# Patient Record
Sex: FEMALE | Race: WHITE | Employment: PART TIME | URBAN - NONMETROPOLITAN AREA
[De-identification: names, ages, dates, MRNs, and addresses within clinical notes are randomized per-mention and may not be internally consistent; named-entity substitution may affect disease eponyms.]

---

## 2023-04-18 ENCOUNTER — OFFICE VISIT (OUTPATIENT)
Dept: FAMILY MEDICINE CLINIC | Age: 48
End: 2023-04-18
Payer: MEDICAID

## 2023-04-18 VITALS
BODY MASS INDEX: 31.77 KG/M2 | SYSTOLIC BLOOD PRESSURE: 126 MMHG | HEIGHT: 68 IN | RESPIRATION RATE: 16 BRPM | DIASTOLIC BLOOD PRESSURE: 78 MMHG | HEART RATE: 71 BPM | OXYGEN SATURATION: 96 % | TEMPERATURE: 97.2 F | WEIGHT: 209.6 LBS

## 2023-04-18 DIAGNOSIS — M79.671 RIGHT FOOT PAIN: Primary | ICD-10-CM

## 2023-04-18 PROBLEM — F32.A DEPRESSION: Status: ACTIVE | Noted: 2023-04-18

## 2023-04-18 PROBLEM — M79.7 FIBROMYALGIA: Status: ACTIVE | Noted: 2023-04-18

## 2023-04-18 PROCEDURE — 99212 OFFICE O/P EST SF 10 MIN: CPT | Performed by: NURSE PRACTITIONER

## 2023-04-18 PROCEDURE — 99203 OFFICE O/P NEW LOW 30 MIN: CPT | Performed by: NURSE PRACTITIONER

## 2023-04-18 RX ORDER — CARIPRAZINE 4.5 MG/1
CAPSULE, GELATIN COATED ORAL
COMMUNITY
Start: 2023-04-05

## 2023-04-18 RX ORDER — DONEPEZIL HYDROCHLORIDE 10 MG/1
10 TABLET, FILM COATED ORAL 2 TIMES DAILY
COMMUNITY
Start: 2023-04-12

## 2023-04-18 RX ORDER — ONDANSETRON 4 MG/1
TABLET, ORALLY DISINTEGRATING ORAL
COMMUNITY
Start: 2022-08-15

## 2023-04-18 RX ORDER — EPINEPHRINE 0.3 MG/.3ML
INJECTION SUBCUTANEOUS
COMMUNITY
Start: 2022-08-29

## 2023-04-18 RX ORDER — CYCLOBENZAPRINE HCL 10 MG
TABLET ORAL
COMMUNITY
Start: 2023-03-02

## 2023-04-18 RX ORDER — LINACLOTIDE 72 UG/1
72 CAPSULE, GELATIN COATED ORAL
COMMUNITY
Start: 2023-04-10

## 2023-04-18 RX ORDER — CONJUGATED ESTROGENS 0.62 MG/G
CREAM VAGINAL
COMMUNITY
Start: 2023-03-09

## 2023-04-18 RX ORDER — DULOXETIN HYDROCHLORIDE 30 MG/1
30 CAPSULE, DELAYED RELEASE ORAL 2 TIMES DAILY
COMMUNITY
Start: 2023-03-14

## 2023-04-18 RX ORDER — AMITRIPTYLINE HYDROCHLORIDE 25 MG/1
25 TABLET, FILM COATED ORAL NIGHTLY
COMMUNITY
Start: 2022-11-01

## 2023-04-18 RX ORDER — LAMOTRIGINE 150 MG/1
150 TABLET ORAL DAILY
COMMUNITY
Start: 2023-04-06

## 2023-04-18 RX ORDER — LANSOPRAZOLE 30 MG/1
CAPSULE, DELAYED RELEASE ORAL
COMMUNITY
Start: 2023-04-05

## 2023-04-18 RX ORDER — PREGABALIN 150 MG/1
150 CAPSULE ORAL 2 TIMES DAILY
COMMUNITY
Start: 2023-04-07

## 2023-04-18 RX ORDER — CALCIUM CARBONATE/VITAMIN D3 500 MG-10
TABLET,CHEWABLE ORAL
COMMUNITY
Start: 2023-03-24

## 2023-04-18 RX ORDER — QUETIAPINE 200 MG/1
TABLET, FILM COATED, EXTENDED RELEASE ORAL
COMMUNITY
Start: 2023-01-12

## 2023-04-18 RX ORDER — LEVOTHYROXINE SODIUM 0.1 MG/1
TABLET ORAL
COMMUNITY
Start: 2023-03-23

## 2023-04-18 RX ORDER — ACETAMINOPHEN 500 MG
500 TABLET ORAL EVERY 6 HOURS PRN
COMMUNITY

## 2023-04-18 RX ORDER — MELOXICAM 15 MG/1
15 TABLET ORAL DAILY
Qty: 14 TABLET | Refills: 0 | Status: SHIPPED | OUTPATIENT
Start: 2023-04-18 | End: 2023-05-02

## 2023-04-18 RX ORDER — DICLOFENAC SODIUM 75 MG/1
TABLET, DELAYED RELEASE ORAL 2 TIMES DAILY
COMMUNITY
Start: 2022-02-01 | End: 2023-04-18

## 2023-04-18 RX ORDER — QUETIAPINE FUMARATE 25 MG/1
TABLET, FILM COATED ORAL
COMMUNITY
Start: 2023-02-09

## 2023-04-18 SDOH — ECONOMIC STABILITY: FOOD INSECURITY: WITHIN THE PAST 12 MONTHS, YOU WORRIED THAT YOUR FOOD WOULD RUN OUT BEFORE YOU GOT MONEY TO BUY MORE.: NEVER TRUE

## 2023-04-18 SDOH — ECONOMIC STABILITY: INCOME INSECURITY: HOW HARD IS IT FOR YOU TO PAY FOR THE VERY BASICS LIKE FOOD, HOUSING, MEDICAL CARE, AND HEATING?: SOMEWHAT HARD

## 2023-04-18 SDOH — ECONOMIC STABILITY: HOUSING INSECURITY
IN THE LAST 12 MONTHS, WAS THERE A TIME WHEN YOU DID NOT HAVE A STEADY PLACE TO SLEEP OR SLEPT IN A SHELTER (INCLUDING NOW)?: NO

## 2023-04-18 SDOH — ECONOMIC STABILITY: FOOD INSECURITY: WITHIN THE PAST 12 MONTHS, THE FOOD YOU BOUGHT JUST DIDN'T LAST AND YOU DIDN'T HAVE MONEY TO GET MORE.: NEVER TRUE

## 2023-04-18 ASSESSMENT — PATIENT HEALTH QUESTIONNAIRE - PHQ9
2. FEELING DOWN, DEPRESSED OR HOPELESS: 1
5. POOR APPETITE OR OVEREATING: 3
SUM OF ALL RESPONSES TO PHQ9 QUESTIONS 1 & 2: 2
7. TROUBLE CONCENTRATING ON THINGS, SUCH AS READING THE NEWSPAPER OR WATCHING TELEVISION: 1
SUM OF ALL RESPONSES TO PHQ QUESTIONS 1-9: 8
1. LITTLE INTEREST OR PLEASURE IN DOING THINGS: 1
SUM OF ALL RESPONSES TO PHQ QUESTIONS 1-9: 8
3. TROUBLE FALLING OR STAYING ASLEEP: 1
10. IF YOU CHECKED OFF ANY PROBLEMS, HOW DIFFICULT HAVE THESE PROBLEMS MADE IT FOR YOU TO DO YOUR WORK, TAKE CARE OF THINGS AT HOME, OR GET ALONG WITH OTHER PEOPLE: 2
SUM OF ALL RESPONSES TO PHQ QUESTIONS 1-9: 8
9. THOUGHTS THAT YOU WOULD BE BETTER OFF DEAD, OR OF HURTING YOURSELF: 0
6. FEELING BAD ABOUT YOURSELF - OR THAT YOU ARE A FAILURE OR HAVE LET YOURSELF OR YOUR FAMILY DOWN: 0
8. MOVING OR SPEAKING SO SLOWLY THAT OTHER PEOPLE COULD HAVE NOTICED. OR THE OPPOSITE, BEING SO FIGETY OR RESTLESS THAT YOU HAVE BEEN MOVING AROUND A LOT MORE THAN USUAL: 0
4. FEELING TIRED OR HAVING LITTLE ENERGY: 1
SUM OF ALL RESPONSES TO PHQ QUESTIONS 1-9: 8

## 2023-04-18 ASSESSMENT — ENCOUNTER SYMPTOMS: BACK PAIN: 0

## 2023-04-18 NOTE — PROGRESS NOTES
(FLEXERIL) 10 MG tablet take 1 tablet by mouth every 8 hours if needed SPASM(S)      DULoxetine (CYMBALTA) 30 MG extended release capsule Take 1 capsule by mouth 2 times daily      lamoTRIgine (LAMICTAL) 150 MG tablet Take 1 tablet by mouth daily      amitriptyline (ELAVIL) 25 MG tablet Take 1 tablet by mouth nightly      VRAYLAR 4.5 MG CAPS capsule take 1 capsule by mouth once daily for BIPOLAR DISORDER      donepezil (ARICEPT) 10 MG tablet Take 1 tablet by mouth 2 times daily      lansoprazole (PREVACID) 30 MG delayed release capsule take 1 capsule by mouth once daily IN THE MORNING      QUEtiapine (SEROQUEL XR) 200 MG extended release tablet Taking 3 tablets at night      QUEtiapine (SEROQUEL) 25 MG tablet TAKE ONE TABLET BY MOUTH TWICE PER DAY FOR ANXIETY/BIPOLAR DISORDER. acetaminophen (TYLENOL) 500 MG tablet Take 1 tablet by mouth every 6 hours as needed      Calcium Carb-Cholecalciferol 500-10 MG-MCG CHEW       EPINEPHrine (EPIPEN) 0.3 MG/0.3ML SOAJ injection       PREMARIN 0.625 MG/GM CREA vaginal cream insert 0.5 gram with APPLICATOR vaginally two times a week  - TO . ..  (REFER TO PRESCRIPTION NOTES). nicotine (NICODERM CQ) 7 MG/24HR APPLY 1 PATCH TO SKIN ONCE A DAY EVERY 24 HOURS      ondansetron (ZOFRAN-ODT) 4 MG disintegrating tablet       pregabalin (LYRICA) 150 MG capsule Take 1 capsule by mouth 2 times daily. meloxicam (MOBIC) 15 MG tablet Take 1 tablet by mouth daily for 14 days 14 tablet 0     No current facility-administered medications for this visit. Allergies   Allergen Reactions    Bee Venom Anaphylaxis    Macadamia Nut Oil Hives     Pistachio nuts  Pistachio nuts      Oxycodone-Acetaminophen Itching       No results found. Subjective:      Review of Systems   Musculoskeletal:  Positive for arthralgias, gait problem, myalgias and stiffness. Negative for back pain and joint swelling. Neurological:  Negative for tingling and numbness.      Objective:     /78 (Site:

## 2023-04-18 NOTE — PATIENT INSTRUCTIONS
Have xray completed. Wear ace wrap for support. Meloxicam daily for foot pain with food. I will call you with results of the xray.

## 2024-12-02 ENCOUNTER — HOSPITAL ENCOUNTER (EMERGENCY)
Age: 49
Discharge: LEFT BEFORE DISCHARGE | End: 2024-12-02
Attending: EMERGENCY MEDICINE

## 2024-12-02 VITALS
WEIGHT: 155 LBS | TEMPERATURE: 97.8 F | BODY MASS INDEX: 24.33 KG/M2 | HEART RATE: 70 BPM | OXYGEN SATURATION: 100 % | RESPIRATION RATE: 17 BRPM | DIASTOLIC BLOOD PRESSURE: 71 MMHG | HEIGHT: 67 IN | SYSTOLIC BLOOD PRESSURE: 147 MMHG

## 2024-12-02 DIAGNOSIS — Z53.21 PATIENT LEFT WITHOUT BEING SEEN: Primary | ICD-10-CM

## 2024-12-02 NOTE — ED TRIAGE NOTES
Pt in with nausea/ dry heaving, generalized abdominal pain started on 11/28/24, pt reports hx of gastroparesis, hypoglycemia, celiac disease, pt also reports issues with bladder stimulator, was recently assessed and was told it is working fine but patient reports urinary incontinence, has appt tomorrow with gastroenterologist pt sees Dr. Campbell with [facility]